# Patient Record
Sex: MALE | Race: BLACK OR AFRICAN AMERICAN | Employment: FULL TIME | URBAN - METROPOLITAN AREA
[De-identification: names, ages, dates, MRNs, and addresses within clinical notes are randomized per-mention and may not be internally consistent; named-entity substitution may affect disease eponyms.]

---

## 2018-12-26 ENCOUNTER — OFFICE VISIT (OUTPATIENT)
Dept: FAMILY MEDICINE CLINIC | Age: 23
End: 2018-12-26
Payer: COMMERCIAL

## 2018-12-26 VITALS
DIASTOLIC BLOOD PRESSURE: 84 MMHG | HEIGHT: 68 IN | BODY MASS INDEX: 41.98 KG/M2 | OXYGEN SATURATION: 98 % | HEART RATE: 88 BPM | SYSTOLIC BLOOD PRESSURE: 126 MMHG | WEIGHT: 277 LBS

## 2018-12-26 DIAGNOSIS — Z83.3 FAMILY HISTORY OF DIABETES MELLITUS (DM): ICD-10-CM

## 2018-12-26 DIAGNOSIS — L02.92 FURUNCULOSIS: ICD-10-CM

## 2018-12-26 DIAGNOSIS — L02.92 FURUNCULOSIS: Primary | ICD-10-CM

## 2018-12-26 LAB
A/G RATIO: 1.5 (ref 1.1–2.2)
ALBUMIN SERPL-MCNC: 4.3 G/DL (ref 3.4–5)
ALP BLD-CCNC: 48 U/L (ref 40–129)
ALT SERPL-CCNC: 17 U/L (ref 10–40)
ANION GAP SERPL CALCULATED.3IONS-SCNC: 14 MMOL/L (ref 3–16)
AST SERPL-CCNC: 13 U/L (ref 15–37)
BASOPHILS ABSOLUTE: 0.1 K/UL (ref 0–0.2)
BASOPHILS RELATIVE PERCENT: 0.8 %
BILIRUB SERPL-MCNC: 0.4 MG/DL (ref 0–1)
BUN BLDV-MCNC: 12 MG/DL (ref 7–20)
CALCIUM SERPL-MCNC: 9.4 MG/DL (ref 8.3–10.6)
CHLORIDE BLD-SCNC: 100 MMOL/L (ref 99–110)
CO2: 28 MMOL/L (ref 21–32)
CREAT SERPL-MCNC: 0.9 MG/DL (ref 0.9–1.3)
EOSINOPHILS ABSOLUTE: 0.2 K/UL (ref 0–0.6)
EOSINOPHILS RELATIVE PERCENT: 3.2 %
GFR AFRICAN AMERICAN: >60
GFR NON-AFRICAN AMERICAN: >60
GLOBULIN: 2.9 G/DL
GLUCOSE BLD-MCNC: 86 MG/DL (ref 70–99)
HCT VFR BLD CALC: 45.8 % (ref 40.5–52.5)
HEMOGLOBIN: 15.4 G/DL (ref 13.5–17.5)
LYMPHOCYTES ABSOLUTE: 2.8 K/UL (ref 1–5.1)
LYMPHOCYTES RELATIVE PERCENT: 37.2 %
MCH RBC QN AUTO: 27.4 PG (ref 26–34)
MCHC RBC AUTO-ENTMCNC: 33.5 G/DL (ref 31–36)
MCV RBC AUTO: 81.7 FL (ref 80–100)
MONOCYTES ABSOLUTE: 0.7 K/UL (ref 0–1.3)
MONOCYTES RELATIVE PERCENT: 8.8 %
NEUTROPHILS ABSOLUTE: 3.8 K/UL (ref 1.7–7.7)
NEUTROPHILS RELATIVE PERCENT: 50 %
PDW BLD-RTO: 13.3 % (ref 12.4–15.4)
PLATELET # BLD: 210 K/UL (ref 135–450)
PMV BLD AUTO: 9.4 FL (ref 5–10.5)
POTASSIUM SERPL-SCNC: 4 MMOL/L (ref 3.5–5.1)
RBC # BLD: 5.6 M/UL (ref 4.2–5.9)
SODIUM BLD-SCNC: 142 MMOL/L (ref 136–145)
TOTAL PROTEIN: 7.2 G/DL (ref 6.4–8.2)
WBC # BLD: 7.6 K/UL (ref 4–11)

## 2018-12-26 PROCEDURE — 99203 OFFICE O/P NEW LOW 30 MIN: CPT | Performed by: FAMILY MEDICINE

## 2018-12-26 RX ORDER — CEPHALEXIN 500 MG/1
500 CAPSULE ORAL 3 TIMES DAILY
Qty: 60 CAPSULE | Refills: 1 | Status: SHIPPED | OUTPATIENT
Start: 2018-12-26 | End: 2020-01-15

## 2018-12-26 ASSESSMENT — PATIENT HEALTH QUESTIONNAIRE - PHQ9
SUM OF ALL RESPONSES TO PHQ9 QUESTIONS 1 & 2: 0
2. FEELING DOWN, DEPRESSED OR HOPELESS: 0
SUM OF ALL RESPONSES TO PHQ QUESTIONS 1-9: 0
1. LITTLE INTEREST OR PLEASURE IN DOING THINGS: 0
SUM OF ALL RESPONSES TO PHQ QUESTIONS 1-9: 0

## 2018-12-26 ASSESSMENT — ENCOUNTER SYMPTOMS
CONSTIPATION: 0
COUGH: 0
SHORTNESS OF BREATH: 0
DIARRHEA: 1

## 2018-12-27 LAB
ESTIMATED AVERAGE GLUCOSE: 119.8 MG/DL
HBA1C MFR BLD: 5.8 %

## 2019-01-16 ENCOUNTER — TELEPHONE (OUTPATIENT)
Dept: FAMILY MEDICINE CLINIC | Age: 24
End: 2019-01-16

## 2020-01-15 ENCOUNTER — OFFICE VISIT (OUTPATIENT)
Dept: FAMILY MEDICINE CLINIC | Age: 25
End: 2020-01-15
Payer: COMMERCIAL

## 2020-01-15 VITALS
BODY MASS INDEX: 37.25 KG/M2 | OXYGEN SATURATION: 96 % | WEIGHT: 275 LBS | HEART RATE: 74 BPM | HEIGHT: 72 IN | RESPIRATION RATE: 14 BRPM | SYSTOLIC BLOOD PRESSURE: 124 MMHG | DIASTOLIC BLOOD PRESSURE: 72 MMHG

## 2020-01-15 PROCEDURE — 99213 OFFICE O/P EST LOW 20 MIN: CPT | Performed by: FAMILY MEDICINE

## 2020-01-15 ASSESSMENT — PATIENT HEALTH QUESTIONNAIRE - PHQ9
SUM OF ALL RESPONSES TO PHQ QUESTIONS 1-9: 0
1. LITTLE INTEREST OR PLEASURE IN DOING THINGS: 0
2. FEELING DOWN, DEPRESSED OR HOPELESS: 0
SUM OF ALL RESPONSES TO PHQ9 QUESTIONS 1 & 2: 0
SUM OF ALL RESPONSES TO PHQ QUESTIONS 1-9: 0

## 2020-01-15 NOTE — PROGRESS NOTES
Subjective:      Patient ID: Marlene Salmeron is a 27 y.o. y.o. male. Has lumps or knots on his scrotum  Feels hit buttocks area is the same    Scrotum knots present a long time ? No systemic sx  No voiding  / urinary sx      HPI      Chief Complaint   Patient presents with    Other     Bumps on genital   Not STD and Not Painful has been going on for a year and getting more does not go away       Allergies   Allergen Reactions    Hydrocodone-Acetaminophen Hives       Past Medical History:   Diagnosis Date    GERD (gastroesophageal reflux disease) 5/23/2012    Migraine 6/7/2010    Morbid obesity ([facility] [address] 10/24/2012    NAFLD (nonalcoholic fatty liver disease) 10/24/2012    Obstructive sleep apnea (adult) (pediatric) 11/14/2012    Pilonidal cyst with abscess        History reviewed. No pertinent surgical history.     Social History     Socioeconomic History    Marital status: Single     Spouse name: Not on file    Number of children: Not on file    Years of education: Not on file    Highest education level: Not on file   Occupational History    Not on file   Social Needs    Financial resource strain: Not on file    Food insecurity:     Worry: Not on file     Inability: Not on file    Transportation needs:     Medical: Not on file     Non-medical: Not on file   Tobacco Use    Smoking status: Never Smoker    Smokeless tobacco: Never Used   Substance and Sexual Activity    Alcohol use: Not Currently    Drug use: Yes     Types: Marijuana    Sexual activity: Yes   Lifestyle    Physical activity:     Days per week: Not on file     Minutes per session: Not on file    Stress: Not on file   Relationships    Social connections:     Talks on phone: Not on file     Gets together: Not on file     Attends Che[employer] service: Not on file     Active member of club or organization: Not on file     Attends meetings of clubs or organizations: Not on file     Relationship status: Not on file    Intimate partner violence: Fear of current or ex partner: Not on file     Emotionally abused: Not on file     Physically abused: Not on file     Forced sexual activity: Not on file   Other Topics Concern    Not on file   Social History Narrative    Not on file       Family History   Problem Relation Age of Onset    Diabetes Father     High Blood Pressure Father        Vitals:    01/15/20 1529   BP: 124/72   Pulse: 74   Resp: 14   SpO2: 96%       Wt Readings from Last 3 Encounters:   01/15/20 275 lb (124.7 kg)   12/26/18 277 lb (125.6 kg)   10/24/16 (!) 300 lb (136.1 kg) (>99 %, Z= 3.06)*     * Growth percentiles are based on CDC (Boys, 2-20 Years) data. Review of Systems   Constitutional: Negative. Negative for unexpected weight change. Gastrointestinal: Negative for constipation and diarrhea. Genitourinary: Negative. Psychiatric/Behavioral: Negative for dysphoric mood. The patient is not nervous/anxious. Objective:   Physical Exam  Constitutional:       General: He is not in acute distress. Appearance: He is not ill-appearing. Pulmonary:      Effort: Pulmonary effort is normal.   Skin:     Comments: Pilonidal area grossly neg      Superficial scrotal skin cysts- inclusion cyst    Testes OK   Neurological:      Mental Status: He is alert and oriented to person, place, and time. Psychiatric:         Mood and Affect: Mood normal.         Behavior: Behavior normal.         Assessment:      Inclusion cyst, scrotum        Plan:   Discussed the finding and diagnosis. Can leave these alone and observe. If the current cyst enlarge quite a bit or more develop, and then I would recommend urology consultation for surgical excision of the simple cyst.    However the current time I do not think there lumbar size or severity warrants excision. Patient will advise me if there is a change. Inst      No current outpatient medications on file. No current facility-administered medications for this visit.

## 2020-01-19 ASSESSMENT — ENCOUNTER SYMPTOMS
DIARRHEA: 0
CONSTIPATION: 0

## 2021-07-19 ENCOUNTER — OFFICE VISIT (OUTPATIENT)
Dept: FAMILY MEDICINE CLINIC | Age: 26
End: 2021-07-19

## 2021-07-19 VITALS
BODY MASS INDEX: 35.62 KG/M2 | WEIGHT: 263 LBS | OXYGEN SATURATION: 98 % | HEART RATE: 92 BPM | DIASTOLIC BLOOD PRESSURE: 89 MMHG | SYSTOLIC BLOOD PRESSURE: 139 MMHG | HEIGHT: 72 IN | TEMPERATURE: 97.3 F

## 2021-07-19 DIAGNOSIS — G89.4 CHRONIC PAIN SYNDROME: ICD-10-CM

## 2021-07-19 DIAGNOSIS — Z86.59 HISTORY OF ADHD: ICD-10-CM

## 2021-07-19 DIAGNOSIS — Z02.89 ENCOUNTER FOR COMPLETION OF FORM WITH PATIENT: Primary | ICD-10-CM

## 2021-07-19 PROCEDURE — 99213 OFFICE O/P EST LOW 20 MIN: CPT | Performed by: FAMILY MEDICINE

## 2021-07-19 ASSESSMENT — ENCOUNTER SYMPTOMS
WHEEZING: 0
BACK PAIN: 0
COUGH: 0
CHEST TIGHTNESS: 0
CHOKING: 0
STRIDOR: 0
ABDOMINAL PAIN: 0
SHORTNESS OF BREATH: 0

## 2021-07-19 ASSESSMENT — PATIENT HEALTH QUESTIONNAIRE - PHQ9
SUM OF ALL RESPONSES TO PHQ QUESTIONS 1-9: 0
SUM OF ALL RESPONSES TO PHQ QUESTIONS 1-9: 0
2. FEELING DOWN, DEPRESSED OR HOPELESS: 0
SUM OF ALL RESPONSES TO PHQ9 QUESTIONS 1 & 2: 0
1. LITTLE INTEREST OR PLEASURE IN DOING THINGS: 0
SUM OF ALL RESPONSES TO PHQ QUESTIONS 1-9: 0

## 2021-07-19 NOTE — PROGRESS NOTES
Subjective:      Patient ID: Soren Yen is a 23 y.o. male. [facility] is here for us to complete a form asking for an accommodation in his rental unit for a  animal.  Bina Brewer has ADHD and chronic pain and finds his dog very comforting and it encourages him to be more physically active in caring for the dog. Review of Systems   Constitutional: Negative for activity change, appetite change, chills, diaphoresis, fatigue, fever and unexpected weight change. Respiratory: Negative for cough, choking, chest tightness, shortness of breath, wheezing and stridor. Cardiovascular: Negative for chest pain, palpitations and leg swelling. Gastrointestinal: Negative for abdominal pain. Genitourinary: Negative for difficulty urinating. Musculoskeletal: Positive for arthralgias and myalgias. Negative for back pain. Neurological: Negative for dizziness. All other systems reviewed and are negative. Objective:   Physical Exam  Vitals and nursing note reviewed. Constitutional:       Appearance: He is well-developed. HENT:      Head: Normocephalic and atraumatic. Right Ear: External ear normal.      Left Ear: External ear normal.      Nose: Nose normal.   Eyes:      Conjunctiva/sclera: Conjunctivae normal.      Pupils: Pupils are equal, round, and reactive to light. Neck:      Thyroid: No thyromegaly. Vascular: No JVD. Trachea: No tracheal deviation. Cardiovascular:      Rate and Rhythm: Normal rate and regular rhythm. Heart sounds: Normal heart sounds. No murmur heard. No friction rub. No gallop. Pulmonary:      Effort: Pulmonary effort is normal. No respiratory distress. Breath sounds: Normal breath sounds. No stridor. No wheezing or rales. Chest:      Chest wall: No tenderness. Abdominal:      General: Bowel sounds are normal. There is no distension. Palpations: Abdomen is soft. There is no mass. Tenderness: There is no abdominal tenderness.  There is

## 2021-07-26 ENCOUNTER — TELEPHONE (OUTPATIENT)
Dept: FAMILY MEDICINE CLINIC | Age: 26
End: 2021-07-26

## 2021-07-26 NOTE — TELEPHONE ENCOUNTER
----- Message from Raz Zaragoza sent at 7/26/2021  2:45 PM EDT -----  Subject: Message to Provider    QUESTIONS  Information for Provider? Patient just recently seen Mary VidesDO   instead of Virgilio Diaz ,while he was at the appointment he was to get some   paper work filled out how ever Mary Vides, DO did not file that paper   work out completely , he need fill out more information ,Patient is asking   to send it over and it be filed out correctly ,was wondering if he could   get it faxed over without a appointment   ---------------------------------------------------------------------------  --------------  [address]  What is the best way for the office to contact you? OK to leave message on   voicemail  Preferred Call Back Phone Number? [phone]  ---------------------------------------------------------------------------  --------------  SCRIPT ANSWERS  Relationship to Patient?  Self

## 2021-07-30 ENCOUNTER — TELEPHONE (OUTPATIENT)
Dept: FAMILY MEDICINE CLINIC | Age: 26
End: 2021-07-30

## 2021-07-30 NOTE — TELEPHONE ENCOUNTER
I called the pt and let him know that his [employer] form is completed and ready to be picked up

## 2021-09-13 ENCOUNTER — TELEPHONE (OUTPATIENT)
Dept: FAMILY MEDICINE CLINIC | Age: 26
End: 2021-09-13

## 2021-09-13 NOTE — TELEPHONE ENCOUNTER
Spoke to patient's mother. He is positive Covid and feels poorly. Discussed supportive measures and therapy. Armin issues and symptoms the would require him going to the emergency room.

## 2021-11-20 ENCOUNTER — TELEPHONE (OUTPATIENT)
Dept: FAMILY MEDICINE CLINIC | Age: 26
End: 2021-11-20

## 2021-11-20 NOTE — TELEPHONE ENCOUNTER
11/20/21 2:28 PM    Received on call page from [place] regarding a large area of redness, pain ans swelling at the top up the buttocks extending up into his back. His mother states it is about the size of a large cookie or grapefruit. The center is hard to touch. He is unable to sit due to the pain. He does have a history of pilonidal cyst in the past.  He denies and fevers or chills and voiced no other complaints. Discussed treatment options including antibiotics with a referral to general surgery on Monday, or going to the emergency room for evaluation. Patient is going to [facility] for evaluation and possible lancing/antibiotics/ medications. Instructed to call office on Monday if not improving.

## 2021-11-22 ENCOUNTER — TELEPHONE (OUTPATIENT)
Dept: FAMILY MEDICINE CLINIC | Age: 26
End: 2021-11-22

## 2021-11-22 DIAGNOSIS — L72.0 EPIDERMOID CYST OF SKIN OF BACK: Primary | ICD-10-CM

## 2021-11-22 NOTE — TELEPHONE ENCOUNTER
----- Message from Sonia Dozier sent at 11/22/2021 10:46 AM EST -----  Subject: Message to Provider    QUESTIONS  Information for Provider? Pt was at [facility] ED on 11/20 and they   Drained a Cyst at his lower back and bandaged it. Want him to have surgery   and they said that he would not be able to have surgery until January and   does not believe that he can wait that long. Would like to know if Dr. Kira Mack could help him. Would like a call back  ---------------------------------------------------------------------------  --------------  CALL BACK INFO  What is the best way for the office to contact you? OK to leave message on   voicemail  Preferred Call Back Phone Number? [phone]  ---------------------------------------------------------------------------  --------------  SCRIPT ANSWERS  Relationship to Patient?  Self

## 2021-11-22 NOTE — TELEPHONE ENCOUNTER
I did a referral to Dr. Ulysses Homestead. Please call the patient with contact information so he can make an appointment.

## 2021-11-22 NOTE — TELEPHONE ENCOUNTER
He needs to see a surgeon. If he wants to stay at the [facility] I can refer him to somebody in that system. Or I can refer him to somebody in the [facility] system and they should be able to see him in the office in the next week or so. Ask him where he wants to be.

## 2021-11-22 NOTE — TELEPHONE ENCOUNTER
I spoke to patient and he stated that he wants to try a [facility] to see if he can get done quicker. Has a [employer] cyst? It has to be cut out .

## 2021-11-23 NOTE — TELEPHONE ENCOUNTER
I spoke to patient and he got a call from [facility] office and they had a cancellation he is going in today for his first visit with surgeon. I gave him [facility] surgeons information just in case.

## 2021-11-29 ENCOUNTER — TELEPHONE (OUTPATIENT)
Dept: SURGERY | Age: 26
End: 2021-11-29